# Patient Record
Sex: MALE | Race: BLACK OR AFRICAN AMERICAN
[De-identification: names, ages, dates, MRNs, and addresses within clinical notes are randomized per-mention and may not be internally consistent; named-entity substitution may affect disease eponyms.]

---

## 2018-08-31 ENCOUNTER — HOSPITAL ENCOUNTER (EMERGENCY)
Dept: HOSPITAL 75 - ER | Age: 38
Discharge: LEFT BEFORE DISCHARGE | End: 2018-08-31
Payer: COMMERCIAL

## 2018-08-31 DIAGNOSIS — T63.301A: Primary | ICD-10-CM

## 2018-09-01 NOTE — XMS REPORT
Continuity of Care Document

 Created on: 2018



LEAH GALVAN

External Reference #: [id number]

: 1980

Sex: Male



Demographics







 Address  [address] [place]  [address]

 

 Home Phone  [phone] x

 

 Preferred Language  Unknown

 

 Marital Status  Unknown

 

 Shinto Affiliation  Unknown

 

 Race  Unknown

 

 Ethnic Group  Unknown





Author







 Author  [facility]

 

 Address  Unknown

 

 Phone  Unavailable



              



Allergies

      





 Active            Description            Code            Type            
Severity            Reaction            Onset            Reported/Identified   
         Relationship to Patient            Clinical Status        

 

 Yes            No Known Drug Allergies            [id number]            Drug 
Allergy            Unknown            N/A                         2013   
                               



                  



Medications

      



There is no data.                  



Problems

      





 Date Dx Coded            Attending            Type            Code            
Diagnosis            Diagnosed By        

 

 2013                         Ot            786.52                       
           

 

 2013                         Ot            786.59                       
           

 

 2013            GREGG RUIZ DO                         786.50         
   CHEST PAIN                     

 

 2014            AYAKA FELDER            Ot            522.5    
                              

 

 2014            DENICE PECK, SWATI HUTSON            Ot            882.0   
                               

 

 2014            DENICE PECK, SWATI HUTSON            Ot            E000.0  
                                

 

 2014            DENICE PECK, SWATI HUTSON            Ot            E849.3  
                                

 

 2014            DENICE PECK, SWATI HUTSON            Ot            E919.9  
                                

 

 2014            SWATI GALDAMEZ MD            Ot            V06.1   
                               

 

 2015                         Ot            729.5                        
          

 

 2015            TRACIE FERRER MD            Ot            910.4      
                            

 

 2015            TRACIE FERRER MD            Ot            E000.8     
                             

 

 2015            TRACIE FERRER MD            Ot            E906.4     
                             

 

 2015            KARLA JAMES MD            Ot            278.00      
                            

 

 2015            KARLA JAMES MD            Ot            397.0       
                           

 

 2015            KARLA JAMES MD            Ot            424.0       
                           

 

 2015            KARLA JAMES MD            Ot            427.31      
                            

 

 2015            KARLA JAMES MD            Ot            V85.38      
                            

 

 2015            KARLA JAMES MD            Ot            278.00      
                            

 

 2015            KARLA JAMES MD            Ot            397.0       
                           

 

 2015            KARLA JAMES MD            Ot            424.0       
                           

 

 2015            KARLA JAMES MD            Ot            427.31      
                            

 

 2015            KARLA JAMES MD            Ot            V85.38      
                            

 

 2015            JACOB PECK, KARLA MORALES            Ot            278.00      
                            

 

 2015            JACOB PECK, KARLA MORALES            Ot            397.0       
                           

 

 2015            JACOB PECK, KARLA MORALES            Ot            424.0       
                           

 

 2015            JACOB PECK, KARLA MOARLES            Ot            427.31      
                            

 

 2015            JACOB PECK, KARLA MORALES            Ot            V85.38      
                            

 

 2015            JACOB PECK, KARLA MORALES            Ot            278.00      
                            

 

 2015            JACOB PECK, KARLA MORALES            Ot            397.0       
                           

 

 2015            JACOB PECK, KARLA J            Ot            424.0       
                           

 

 2015            JACOB PECK, KARLA J            Ot            427.31      
                            

 

 2015            JACOB PECK, KARLA MORALES            Ot            V85.38      
                            

 

 2015            JACOB PECK, KARLA MORALES            Ot            278.00      
                            

 

 2015            JACOB PECK, KARLA MORALES            Ot            327.23      
                            

 

 2015            JACOB PECK, KARLA MORALES            Ot            397.0       
                           

 

 2015            JACOB PECK, KARLA MORALES            Ot            424.0       
                           

 

 2015            JACOB PECK, KARLA MORALES            Ot            427.31      
                            

 

 2015            JACOB PECK, KARLA MORALES            Ot            786.59      
                            

 

 2015            JACOB PECK, KARLA MORALES            Ot            V85.38      
                            

 

 2015            PAPA WOLFF            Ot            
278.00                                  

 

 2015            PAPA WOLFF            Ot            
427.31                                  

 

 2015            PAPA WOLFF            Ot            
785.1                                  

 

 2015            PAPA WOLFF            Ot            
786.50                                  



                                                                               
                         



Procedures

      



There is no data.                  



Results

      



There is no data.              



Encounters

      





 ACCT No.            Visit Date/Time            Discharge            Status    
        Pt. Type            Provider            Facility            Loc./Unit  
          Complaint        

 

 [id number]            2013 17:37:00            2013 23:59:59          
  CLS            Outpatient            GREGG RUIZ DO                        
                       

 

 [id number]            2015 06:45:00            2015 23:59:59    
        CLS            Outpatient            PAPA WOLFF    
        [facility]            CARD                     

 

 [id number]            2015 08:58:00            2015 10:25:00    
        DIS            Inpatient            KARLA JAMES MD            [facility] 
[facility]                     

 

 [id number]            2015 17:14:00            2015 18:04:00    
        DIS            Emergency            NABIL PECK, TRACIE MELISSA            [facility] 
[facility]            ER                     

 

 [id number]            2014 23:11:00            2014 00:43:00    
        DIS            Emergency            DENICE PECK, SWATI HUTSON            
[facility]            ER                     

 

 [id number]            2014 13:31:00            2014 15:35:00    
        DIS            Emergency            AYAKA FELDER            
[facility]            ER                     

 

 [id number]            2015 17:51:00                                   
   Document Registration                                                       
     

 

 [id number]            2013 18:25:00                                   
   Document Registration

## 2022-07-26 ENCOUNTER — HOSPITAL ENCOUNTER (EMERGENCY)
Dept: HOSPITAL 75 - ER | Age: 42
LOS: 1 days | Discharge: HOME | End: 2022-07-27
Payer: COMMERCIAL

## 2022-07-26 VITALS — DIASTOLIC BLOOD PRESSURE: 95 MMHG | SYSTOLIC BLOOD PRESSURE: 134 MMHG

## 2022-07-26 DIAGNOSIS — H83.03: ICD-10-CM

## 2022-07-26 DIAGNOSIS — H65.93: Primary | ICD-10-CM

## 2022-07-26 PROCEDURE — 99281 EMR DPT VST MAYX REQ PHY/QHP: CPT

## 2022-07-26 NOTE — ED EENT
History of Present Illness


General


Chief Complaint:  Ear Problems


Stated Complaint:  PRESSURE IN EARS


Nursing Triage Note:  


pt presents with c/o bilateral ear pressure. denies pain. reports onset one hour




ago.


Source:  patient


Exam Limitations:  no limitations





History of Present Illness


Date Seen by Provider:  Jul 26, 2022


Time Seen by Provider:  23:36


Initial Comments


Patient to the ER by private conveyance with chief complaint that this evening 

he started having some whooshing in his ears pressure without pain especially on

the right side and feeling like his ears need to pop.  No nasal congestion 

fevers chills runny nose sore throat.





Allergies and Home Medications


Allergies


Coded Allergies:  


     No Known Drug Allergies (Unverified , 1/14/13)





Patient Home Medication List


Home Medication List Reviewed:  Yes


Apixaban (Eliquis Tablet) 5 Mg Tablet, 5 MG PO BID


   Prescribed by: KARLA JAMES on 6/4/15 0813


Methylprednisolone (Methylprednisolone Dose Pack) 4 Mg Tab.ds.pk, 4 MG PO UD


   Prescribed by: LILLIANA JUSTICE on 7/26/22 3513


Metoprolol Tartrate (Lopressor Tab) 25 Mg Tablet, 12.5 MG PO BID


   Prescribed by: KARLA JAMES on 6/4/15 0813


Pantoprazole Sod (Protonix Tab) 40 Mg Tab, 40 MG PO DAILY@0700


   Prescribed by: KARLA JAMES on 6/4/15 0813





Review of Systems


Review of Systems


Constitutional:  No chills, No diaphoresis


Eyes:  Denies Blindness, Denies Blurred Vision


Ears:  See HPI, Dizziness; Denies Pain; Tinnitus


Nose:  denies clots, denies congestion


Respiratory:  No cough, No phlegm


Cardiovascular:  No chest pain, No palpitations


Gastrointestinal:  No abdominal pain, No nausea, No vomiting





All Other Systems Reviewed


Negative Unless Noted:  Yes





Past Medical-Social-Family Hx


Patient Social History


Tobacco Use?:  No


Substance use?:  No


Alcohol Use?:  No


Pt feels they are or have been:  No





Immunizations Up To Date


Tetanus Booster (TDap):  More than 5yrs


Influenza Vaccine Up-to-Date:  No; Not Current





Past Medical History


Asthma


Reproductive Disorders:  No


Sexually Transmitted Disease:  No


Loss of Vision:  Denies


Hearing Impairment:  Denies





Family Medical History





Cardiovascular disease


  20 MOTHER (cva)


Diabetes mellitus


  20 MOTHER


Diabetes, Stroke





Physical Exam


Vital Signs





Vital Signs - First Documented








 7/26/22





 23:35


 


Pulse 89


 


Resp 18


 


B/P (MAP) 134/95 (108)


 


Pulse Ox 99








Height, Weight, BMI


Height: 0'74.00"


Weight: 320lbs. 8.0oz. 145.567111nt;  BMI


Method:Stated


General Appearance:  WD/WN, no apparent distress


Eyes:  bilateral eye normal inspection, bilateral eye PERRL, bilateral eye EOMI


Ears:  right ear TM red (Moderately injected but still can be seen through and 

the TM is retracted down); left ear TM normal; bilateral ear auricle normal, 

bilateral ear canal normal


Nose:  normal inspection; No active bleeding, No discharge


Mouth/Throat:  normal mouth inspection, pharynx normal


Neurologic/Psychiatric:  alert, normal mood/affect, oriented x 3


Skin:  normal color, warm/dry





Progress/Results/Core Measures


Results/Orders


Vital Signs/I&O











 7/26/22





 23:35


 


Pulse 89


 


Resp 18


 


B/P (MAP) 134/95 (108)


 


Pulse Ox 99














Blood Pressure Mean:                    108











Departure


Impression





   Primary Impression:  


   Right otitis media with effusion


   Additional Impression:  


   Labyrinthitis of right ear


Disposition:  01 HOME, SELF-CARE


Condition:  Stable





Departure-Patient Inst.


Decision time for Depature:  23:50


Referrals:  


NO,LOCAL PHYSICIAN (PCP/Family)


Primary Care Physician


Patient Instructions:  Serous Otitis Media (DC)





Add. Discharge Instructions:  


You have an effusion of your ear causing the eardrum to be sucked down which is 

causing the pressure and the difficulty with balance.  If you are having 

problems with balance then use 1 or 2 tablets of meclizine/Antivert every 6 

hours as needed.


 a bottle of fluticasone/Flonase and use 1 puff in each nostril twice a 

day for 1 to 2 weeks until your symptoms go away.  Usually start to see imp

rovement in 3 to 4 days of using the Flonase.


If you develop fever or severe pain this may be indication of a bacterial 

infection in your ear and you need to be rechecked out for potential 

antibiotics.


You may use Claritin/loratadine or Zyrtec/cetirizine 10 mg daily to help reduce 

allergies which can also be a common cause of this.


If after 1 to 2 weeks of Flonase the symptoms do not improve then go ahead and 

 the Medrol Dosepak and use that in addition to Flonase.


All discharge instructions reviewed with patient and/or family. Voiced 

understanding.


Scripts


Methylprednisolone (Methylprednisolone Dose Pack) 4 Mg Tab.ds.pk


4 MG PO UD for 6 Days, #21 PKG 0 Refills


   PER DOSE PACK INSTRUCTIONS


   Prov: LILLIANA JUSTICE         7/26/22


Work/School Note:  Work Release Form   Date Seen in the Emergency Department:  

Jul 26, 2022


   Return to Work:  Jul 27, 2022


   Restrictions:  No Restrictions











LILLIANA JUSTICE                 Jul 26, 2022 23:51